# Patient Record
Sex: MALE | Race: WHITE | URBAN - NONMETROPOLITAN AREA
[De-identification: names, ages, dates, MRNs, and addresses within clinical notes are randomized per-mention and may not be internally consistent; named-entity substitution may affect disease eponyms.]

---

## 2017-02-16 ENCOUNTER — DOCTOR'S OFFICE (OUTPATIENT)
Dept: URBAN - NONMETROPOLITAN AREA CLINIC 1 | Facility: CLINIC | Age: 57
Setting detail: OPHTHALMOLOGY
End: 2017-02-16
Payer: COMMERCIAL

## 2017-02-16 DIAGNOSIS — H40.003: ICD-10-CM

## 2017-02-16 DIAGNOSIS — H35.3112: ICD-10-CM

## 2017-02-16 DIAGNOSIS — H34.11: ICD-10-CM

## 2017-02-16 DIAGNOSIS — H40.001: ICD-10-CM

## 2017-02-16 DIAGNOSIS — H40.002: ICD-10-CM

## 2017-02-16 PROCEDURE — 92014 COMPRE OPH EXAM EST PT 1/>: CPT | Performed by: OPHTHALMOLOGY

## 2017-02-16 PROCEDURE — 92235 FLUORESCEIN ANGRPH MLTIFRAME: CPT | Performed by: OPHTHALMOLOGY

## 2017-02-16 PROCEDURE — 92134 CPTRZ OPH DX IMG PST SGM RTA: CPT | Performed by: OPHTHALMOLOGY

## 2017-02-16 PROCEDURE — 92250 FUNDUS PHOTOGRAPHY W/I&R: CPT | Performed by: OPHTHALMOLOGY

## 2017-02-16 ASSESSMENT — REFRACTION_MANIFEST
OD_VA1: 20/
OU_VA: 20/
OS_VA2: 20/
OS_VA3: 20/
OS_VA1: 20/
OS_VA1: 20/
OD_VA2: 20/
OS_VA3: 20/
OD_VA2: 20/
OS_VA2: 20/
OD_VA3: 20/
OD_VA1: 20/
OU_VA: 20/
OD_VA1: 20/
OS_VA3: 20/
OS_VA2: 20/
OU_VA: 20/
OD_VA2: 20/
OD_VA3: 20/
OS_VA1: 20/
OD_VA3: 20/

## 2017-02-16 ASSESSMENT — VISUAL ACUITY
OS_BCVA: CF 3FT
OD_BCVA: 20/20

## 2017-02-16 ASSESSMENT — REFRACTION_CURRENTRX
OS_OVR_VA: 20/
OD_OVR_VA: 20/
OD_OVR_VA: 20/
OS_OVR_VA: 20/
OD_OVR_VA: 20/
OS_OVR_VA: 20/

## 2017-02-16 ASSESSMENT — CONFRONTATIONAL VISUAL FIELD TEST (CVF)
OD_FINDINGS: FULL
OS_FINDINGS: FULL

## 2017-04-20 ENCOUNTER — DOCTOR'S OFFICE (OUTPATIENT)
Dept: URBAN - NONMETROPOLITAN AREA CLINIC 1 | Facility: CLINIC | Age: 57
Setting detail: OPHTHALMOLOGY
End: 2017-04-20
Payer: COMMERCIAL

## 2017-04-20 DIAGNOSIS — H40.003: ICD-10-CM

## 2017-04-20 DIAGNOSIS — H40.002: ICD-10-CM

## 2017-04-20 DIAGNOSIS — H34.11: ICD-10-CM

## 2017-04-20 DIAGNOSIS — H40.001: ICD-10-CM

## 2017-04-20 DIAGNOSIS — H35.3112: ICD-10-CM

## 2017-04-20 PROCEDURE — 92134 CPTRZ OPH DX IMG PST SGM RTA: CPT | Performed by: OPHTHALMOLOGY

## 2017-04-20 PROCEDURE — 92020 GONIOSCOPY: CPT | Performed by: OPHTHALMOLOGY

## 2017-04-20 PROCEDURE — 92014 COMPRE OPH EXAM EST PT 1/>: CPT | Performed by: OPHTHALMOLOGY

## 2017-04-20 ASSESSMENT — REFRACTION_MANIFEST
OD_VA2: 20/
OS_VA2: 20/
OS_VA3: 20/
OS_VA1: 20/
OD_VA3: 20/
OD_VA1: 20/
OD_VA2: 20/
OS_VA1: 20/
OD_VA3: 20/
OS_VA1: 20/
OS_VA2: 20/
OU_VA: 20/
OU_VA: 20/
OD_VA1: 20/
OU_VA: 20/
OD_VA3: 20/
OS_VA3: 20/
OD_VA2: 20/
OD_VA1: 20/
OS_VA2: 20/
OS_VA3: 20/

## 2017-04-20 ASSESSMENT — REFRACTION_CURRENTRX
OD_OVR_VA: 20/
OS_OVR_VA: 20/
OS_OVR_VA: 20/
OD_OVR_VA: 20/
OS_OVR_VA: 20/
OD_OVR_VA: 20/

## 2017-04-20 ASSESSMENT — VISUAL ACUITY
OS_BCVA: CF 3FT
OD_BCVA: 20/20

## 2017-04-20 ASSESSMENT — CONFRONTATIONAL VISUAL FIELD TEST (CVF)
OD_FINDINGS: FULL
OS_FINDINGS: FULL

## 2017-06-22 ENCOUNTER — DOCTOR'S OFFICE (OUTPATIENT)
Dept: URBAN - NONMETROPOLITAN AREA CLINIC 1 | Facility: CLINIC | Age: 57
Setting detail: OPHTHALMOLOGY
End: 2017-06-22
Payer: COMMERCIAL

## 2017-06-22 DIAGNOSIS — H34.11: ICD-10-CM

## 2017-06-22 DIAGNOSIS — H35.3112: ICD-10-CM

## 2017-06-22 DIAGNOSIS — H40.003: ICD-10-CM

## 2017-06-22 PROCEDURE — 92250 FUNDUS PHOTOGRAPHY W/I&R: CPT | Performed by: OPHTHALMOLOGY

## 2017-06-22 PROCEDURE — 92014 COMPRE OPH EXAM EST PT 1/>: CPT | Performed by: OPHTHALMOLOGY

## 2017-06-22 PROCEDURE — 92134 CPTRZ OPH DX IMG PST SGM RTA: CPT | Performed by: OPHTHALMOLOGY

## 2017-06-22 PROCEDURE — 92235 FLUORESCEIN ANGRPH MLTIFRAME: CPT | Performed by: OPHTHALMOLOGY

## 2017-06-22 ASSESSMENT — CONFRONTATIONAL VISUAL FIELD TEST (CVF)
OD_FINDINGS: FULL
OS_FINDINGS: FULL

## 2017-06-22 ASSESSMENT — REFRACTION_MANIFEST
OS_VA1: 20/
OS_VA2: 20/
OU_VA: 20/
OS_VA1: 20/
OD_VA3: 20/
OS_VA2: 20/
OS_VA3: 20/
OD_VA2: 20/
OU_VA: 20/
OS_VA3: 20/
OU_VA: 20/
OS_VA2: 20/
OD_VA3: 20/
OD_VA1: 20/
OS_VA1: 20/
OD_VA1: 20/
OD_VA3: 20/
OD_VA1: 20/
OS_VA3: 20/

## 2017-06-22 ASSESSMENT — REFRACTION_CURRENTRX
OS_OVR_VA: 20/
OD_OVR_VA: 20/
OD_OVR_VA: 20/
OS_OVR_VA: 20/
OS_OVR_VA: 20/
OD_OVR_VA: 20/

## 2017-06-22 ASSESSMENT — VISUAL ACUITY
OS_BCVA: CF 3FT
OD_BCVA: 20/20-1

## 2017-10-20 ENCOUNTER — DOCTOR'S OFFICE (OUTPATIENT)
Dept: URBAN - NONMETROPOLITAN AREA CLINIC 1 | Facility: CLINIC | Age: 57
Setting detail: OPHTHALMOLOGY
End: 2017-10-20
Payer: COMMERCIAL

## 2017-10-20 DIAGNOSIS — H35.3112: ICD-10-CM

## 2017-10-20 DIAGNOSIS — H34.11: ICD-10-CM

## 2017-10-20 DIAGNOSIS — H40.003: ICD-10-CM

## 2017-10-20 PROCEDURE — 92134 CPTRZ OPH DX IMG PST SGM RTA: CPT | Performed by: OPHTHALMOLOGY

## 2017-10-20 PROCEDURE — 92014 COMPRE OPH EXAM EST PT 1/>: CPT | Performed by: OPHTHALMOLOGY

## 2017-10-20 ASSESSMENT — REFRACTION_CURRENTRX
OS_OVR_VA: 20/
OD_OVR_VA: 20/
OD_OVR_VA: 20/
OS_OVR_VA: 20/
OD_OVR_VA: 20/
OS_OVR_VA: 20/

## 2017-10-20 ASSESSMENT — REFRACTION_MANIFEST
OS_VA3: 20/
OD_VA3: 20/
OS_VA2: 20/
OD_VA2: 20/
OD_VA1: 20/
OD_VA1: 20/
OS_VA1: 20/
OD_VA3: 20/
OS_VA2: 20/
OU_VA: 20/
OD_VA1: 20/
OS_VA1: 20/
OU_VA: 20/
OS_VA2: 20/
OS_VA3: 20/
OD_VA2: 20/
OS_VA3: 20/
OD_VA2: 20/
OU_VA: 20/
OD_VA3: 20/
OS_VA1: 20/

## 2017-10-20 ASSESSMENT — CONFRONTATIONAL VISUAL FIELD TEST (CVF)
OD_FINDINGS: FULL
OS_FINDINGS: FULL

## 2017-10-20 ASSESSMENT — VISUAL ACUITY
OD_BCVA: 20/25-1
OS_BCVA: CF 3FT

## 2017-11-17 ENCOUNTER — DOCTOR'S OFFICE (OUTPATIENT)
Dept: URBAN - NONMETROPOLITAN AREA CLINIC 1 | Facility: CLINIC | Age: 57
Setting detail: OPHTHALMOLOGY
End: 2017-11-17
Payer: COMMERCIAL

## 2017-11-17 DIAGNOSIS — H40.003: ICD-10-CM

## 2017-11-17 DIAGNOSIS — H35.3112: ICD-10-CM

## 2017-11-17 DIAGNOSIS — H43.811: ICD-10-CM

## 2017-11-17 DIAGNOSIS — H34.11: ICD-10-CM

## 2017-11-17 DIAGNOSIS — H43.812: ICD-10-CM

## 2017-11-17 PROCEDURE — 92235 FLUORESCEIN ANGRPH MLTIFRAME: CPT | Performed by: OPHTHALMOLOGY

## 2017-11-17 PROCEDURE — 92250 FUNDUS PHOTOGRAPHY W/I&R: CPT | Performed by: OPHTHALMOLOGY

## 2017-11-17 PROCEDURE — 92014 COMPRE OPH EXAM EST PT 1/>: CPT | Performed by: OPHTHALMOLOGY

## 2017-11-17 PROCEDURE — 92226 OPHTHALMOSCOPY EXT SUBSEQUENT: CPT | Performed by: OPHTHALMOLOGY

## 2017-11-17 ASSESSMENT — REFRACTION_CURRENTRX
OD_OVR_VA: 20/
OD_OVR_VA: 20/
OS_OVR_VA: 20/
OD_OVR_VA: 20/
OS_OVR_VA: 20/
OS_OVR_VA: 20/

## 2017-11-17 ASSESSMENT — REFRACTION_MANIFEST
OS_VA1: 20/
OU_VA: 20/
OD_VA3: 20/
OS_VA3: 20/
OS_VA3: 20/
OD_VA2: 20/
OD_VA3: 20/
OD_VA3: 20/
OS_VA3: 20/
OD_VA2: 20/
OD_VA1: 20/
OD_VA1: 20/
OS_VA1: 20/
OU_VA: 20/
OS_VA2: 20/
OU_VA: 20/
OS_VA2: 20/
OS_VA2: 20/
OS_VA1: 20/
OD_VA1: 20/
OD_VA2: 20/

## 2017-11-17 ASSESSMENT — CONFRONTATIONAL VISUAL FIELD TEST (CVF)
OD_FINDINGS: FULL
OS_FINDINGS: FULL

## 2017-11-17 ASSESSMENT — VISUAL ACUITY
OS_BCVA: CF 3FT
OD_BCVA: 20/25-1

## 2018-01-11 ENCOUNTER — DOCTOR'S OFFICE (OUTPATIENT)
Dept: URBAN - NONMETROPOLITAN AREA CLINIC 1 | Facility: CLINIC | Age: 58
Setting detail: OPHTHALMOLOGY
End: 2018-01-11
Payer: COMMERCIAL

## 2018-01-11 DIAGNOSIS — G45.9: ICD-10-CM

## 2018-01-11 DIAGNOSIS — H34.11: ICD-10-CM

## 2018-01-11 DIAGNOSIS — H43.812: ICD-10-CM

## 2018-01-11 DIAGNOSIS — H40.003: ICD-10-CM

## 2018-01-11 DIAGNOSIS — H43.813: ICD-10-CM

## 2018-01-11 DIAGNOSIS — H35.3112: ICD-10-CM

## 2018-01-11 DIAGNOSIS — H43.811: ICD-10-CM

## 2018-01-11 PROCEDURE — 92226 OPHTHALMOSCOPY EXT SUBSEQUENT: CPT | Performed by: OPHTHALMOLOGY

## 2018-01-11 PROCEDURE — 92134 CPTRZ OPH DX IMG PST SGM RTA: CPT | Performed by: OPHTHALMOLOGY

## 2018-01-11 PROCEDURE — 92014 COMPRE OPH EXAM EST PT 1/>: CPT | Performed by: OPHTHALMOLOGY

## 2018-01-11 ASSESSMENT — REFRACTION_CURRENTRX
OS_OVR_VA: 20/
OS_OVR_VA: 20/
OD_OVR_VA: 20/
OS_OVR_VA: 20/

## 2018-01-11 ASSESSMENT — REFRACTION_MANIFEST
OS_VA3: 20/
OD_VA3: 20/
OU_VA: 20/
OD_VA1: 20/
OD_VA2: 20/
OS_VA1: 20/
OS_VA2: 20/
OS_VA2: 20/
OS_VA1: 20/
OU_VA: 20/
OS_VA1: 20/
OD_VA2: 20/
OD_VA2: 20/
OS_VA3: 20/
OS_VA3: 20/
OD_VA1: 20/
OU_VA: 20/
OD_VA1: 20/
OD_VA3: 20/
OS_VA2: 20/
OD_VA3: 20/

## 2018-01-11 ASSESSMENT — CONFRONTATIONAL VISUAL FIELD TEST (CVF)
OS_FINDINGS: FULL
OD_FINDINGS: FULL

## 2018-01-11 ASSESSMENT — VISUAL ACUITY
OD_BCVA: 20/25
OS_BCVA: 20/300

## 2018-01-12 ENCOUNTER — DOCTOR'S OFFICE (OUTPATIENT)
Dept: URBAN - NONMETROPOLITAN AREA CLINIC 1 | Facility: CLINIC | Age: 58
Setting detail: OPHTHALMOLOGY
End: 2018-01-12
Payer: COMMERCIAL

## 2018-01-12 DIAGNOSIS — G45.9: ICD-10-CM

## 2018-01-12 PROCEDURE — 92083 EXTENDED VISUAL FIELD XM: CPT | Performed by: OPHTHALMOLOGY

## 2018-01-25 ENCOUNTER — DOCTOR'S OFFICE (OUTPATIENT)
Dept: URBAN - NONMETROPOLITAN AREA CLINIC 1 | Facility: CLINIC | Age: 58
Setting detail: OPHTHALMOLOGY
End: 2018-01-25
Payer: COMMERCIAL

## 2018-01-25 DIAGNOSIS — H35.3112: ICD-10-CM

## 2018-01-25 DIAGNOSIS — G45.9: ICD-10-CM

## 2018-01-25 DIAGNOSIS — H43.813: ICD-10-CM

## 2018-01-25 DIAGNOSIS — H40.003: ICD-10-CM

## 2018-01-25 DIAGNOSIS — H34.11: ICD-10-CM

## 2018-01-25 PROCEDURE — 92014 COMPRE OPH EXAM EST PT 1/>: CPT | Performed by: OPHTHALMOLOGY

## 2018-01-25 PROCEDURE — 92250 FUNDUS PHOTOGRAPHY W/I&R: CPT | Performed by: OPHTHALMOLOGY

## 2018-01-25 PROCEDURE — 92235 FLUORESCEIN ANGRPH MLTIFRAME: CPT | Performed by: OPHTHALMOLOGY

## 2018-01-25 ASSESSMENT — VISUAL ACUITY
OS_BCVA: 20/400
OD_BCVA: 20/20

## 2018-01-25 ASSESSMENT — REFRACTION_MANIFEST
OS_VA1: 20/
OD_VA2: 20/
OD_VA2: 20/
OD_VA3: 20/
OS_VA1: 20/
OD_VA1: 20/
OS_VA2: 20/
OD_VA1: 20/
OS_VA3: 20/
OU_VA: 20/
OS_VA1: 20/
OD_VA2: 20/
OU_VA: 20/
OS_VA3: 20/
OD_VA3: 20/
OS_VA3: 20/
OD_VA1: 20/
OS_VA2: 20/
OU_VA: 20/
OD_VA3: 20/
OS_VA2: 20/

## 2018-01-25 ASSESSMENT — REFRACTION_CURRENTRX
OD_OVR_VA: 20/
OS_OVR_VA: 20/
OD_OVR_VA: 20/
OS_OVR_VA: 20/
OS_OVR_VA: 20/
OD_OVR_VA: 20/

## 2018-01-25 ASSESSMENT — CONFRONTATIONAL VISUAL FIELD TEST (CVF)
OS_FINDINGS: FULL
OD_FINDINGS: FULL

## 2018-03-22 ENCOUNTER — DOCTOR'S OFFICE (OUTPATIENT)
Dept: URBAN - NONMETROPOLITAN AREA CLINIC 1 | Facility: CLINIC | Age: 58
Setting detail: OPHTHALMOLOGY
End: 2018-03-22
Payer: COMMERCIAL

## 2018-03-22 DIAGNOSIS — H35.3112: ICD-10-CM

## 2018-03-22 DIAGNOSIS — H43.811: ICD-10-CM

## 2018-03-22 DIAGNOSIS — H43.812: ICD-10-CM

## 2018-03-22 DIAGNOSIS — H40.003: ICD-10-CM

## 2018-03-22 DIAGNOSIS — H43.813: ICD-10-CM

## 2018-03-22 DIAGNOSIS — H34.11: ICD-10-CM

## 2018-03-22 PROCEDURE — 92134 CPTRZ OPH DX IMG PST SGM RTA: CPT | Performed by: OPHTHALMOLOGY

## 2018-03-22 PROCEDURE — 92226 OPHTHALMOSCOPY EXT SUBSEQUENT: CPT | Performed by: OPHTHALMOLOGY

## 2018-03-22 PROCEDURE — 92014 COMPRE OPH EXAM EST PT 1/>: CPT | Performed by: OPHTHALMOLOGY

## 2018-03-22 ASSESSMENT — REFRACTION_MANIFEST
OD_VA1: 20/
OD_VA3: 20/
OS_VA1: 20/
OS_VA1: 20/
OD_VA3: 20/
OU_VA: 20/
OD_VA2: 20/
OS_VA1: 20/
OS_VA2: 20/
OU_VA: 20/
OS_VA2: 20/
OU_VA: 20/
OS_VA2: 20/
OS_VA3: 20/
OD_VA2: 20/
OS_VA3: 20/
OD_VA2: 20/
OD_VA1: 20/
OD_VA3: 20/
OD_VA1: 20/
OS_VA3: 20/

## 2018-03-22 ASSESSMENT — REFRACTION_CURRENTRX
OS_OVR_VA: 20/
OD_OVR_VA: 20/

## 2018-03-22 ASSESSMENT — VISUAL ACUITY
OD_BCVA: 20/20
OS_BCVA: 20/400

## 2018-03-22 ASSESSMENT — CONFRONTATIONAL VISUAL FIELD TEST (CVF)
OS_FINDINGS: FULL
OD_FINDINGS: FULL

## 2018-05-31 ENCOUNTER — DOCTOR'S OFFICE (OUTPATIENT)
Dept: URBAN - NONMETROPOLITAN AREA CLINIC 1 | Facility: CLINIC | Age: 58
Setting detail: OPHTHALMOLOGY
End: 2018-05-31
Payer: COMMERCIAL

## 2018-05-31 DIAGNOSIS — G45.9: ICD-10-CM

## 2018-05-31 DIAGNOSIS — H43.812: ICD-10-CM

## 2018-05-31 DIAGNOSIS — H40.003: ICD-10-CM

## 2018-05-31 DIAGNOSIS — H34.11: ICD-10-CM

## 2018-05-31 DIAGNOSIS — H43.813: ICD-10-CM

## 2018-05-31 DIAGNOSIS — H35.3112: ICD-10-CM

## 2018-05-31 DIAGNOSIS — H43.811: ICD-10-CM

## 2018-05-31 PROCEDURE — 92014 COMPRE OPH EXAM EST PT 1/>: CPT | Performed by: OPHTHALMOLOGY

## 2018-05-31 PROCEDURE — 92226 OPHTHALMOSCOPY EXT SUBSEQUENT: CPT | Performed by: OPHTHALMOLOGY

## 2018-05-31 PROCEDURE — 92134 CPTRZ OPH DX IMG PST SGM RTA: CPT | Performed by: OPHTHALMOLOGY

## 2018-05-31 ASSESSMENT — VISUAL ACUITY
OD_BCVA: 20/20
OS_BCVA: 20/400

## 2018-05-31 ASSESSMENT — REFRACTION_MANIFEST
OS_VA2: 20/
OS_VA3: 20/
OS_VA1: 20/
OD_VA3: 20/
OD_VA2: 20/
OD_VA1: 20/
OS_VA1: 20/
OS_VA3: 20/
OS_VA2: 20/
OU_VA: 20/
OS_VA1: 20/
OD_VA3: 20/
OD_VA3: 20/
OD_VA2: 20/
OD_VA1: 20/
OS_VA3: 20/
OU_VA: 20/
OU_VA: 20/
OS_VA2: 20/
OD_VA2: 20/
OD_VA1: 20/

## 2018-05-31 ASSESSMENT — REFRACTION_CURRENTRX
OS_OVR_VA: 20/
OS_OVR_VA: 20/
OD_OVR_VA: 20/
OS_OVR_VA: 20/
OD_OVR_VA: 20/
OD_OVR_VA: 20/

## 2018-05-31 ASSESSMENT — CONFRONTATIONAL VISUAL FIELD TEST (CVF)
OD_FINDINGS: FULL
OS_FINDINGS: FULL

## 2018-06-15 ENCOUNTER — DOCTOR'S OFFICE (OUTPATIENT)
Dept: URBAN - NONMETROPOLITAN AREA CLINIC 1 | Facility: CLINIC | Age: 58
Setting detail: OPHTHALMOLOGY
End: 2018-06-15
Payer: COMMERCIAL

## 2018-06-15 DIAGNOSIS — H35.3112: ICD-10-CM

## 2018-06-15 DIAGNOSIS — H34.11: ICD-10-CM

## 2018-06-15 DIAGNOSIS — H43.813: ICD-10-CM

## 2018-06-15 DIAGNOSIS — H40.003: ICD-10-CM

## 2018-06-15 PROCEDURE — 92235 FLUORESCEIN ANGRPH MLTIFRAME: CPT | Performed by: OPHTHALMOLOGY

## 2018-06-15 PROCEDURE — 92250 FUNDUS PHOTOGRAPHY W/I&R: CPT | Performed by: OPHTHALMOLOGY

## 2018-06-15 PROCEDURE — 92014 COMPRE OPH EXAM EST PT 1/>: CPT | Performed by: OPHTHALMOLOGY

## 2018-06-15 ASSESSMENT — REFRACTION_MANIFEST
OS_VA3: 20/
OS_VA1: 20/
OS_VA3: 20/
OU_VA: 20/
OD_VA1: 20/
OD_VA2: 20/
OD_VA3: 20/
OS_VA1: 20/
OU_VA: 20/
OD_VA3: 20/
OS_VA3: 20/
OS_VA2: 20/
OD_VA1: 20/
OU_VA: 20/
OD_VA3: 20/
OD_VA2: 20/
OD_VA1: 20/
OD_VA2: 20/
OS_VA2: 20/
OS_VA2: 20/
OS_VA1: 20/

## 2018-06-15 ASSESSMENT — REFRACTION_CURRENTRX
OD_OVR_VA: 20/
OS_OVR_VA: 20/
OD_OVR_VA: 20/
OS_OVR_VA: 20/
OS_OVR_VA: 20/
OD_OVR_VA: 20/

## 2018-06-15 ASSESSMENT — VISUAL ACUITY
OS_BCVA: 20/400
OD_BCVA: 20/20

## 2018-06-15 ASSESSMENT — CONFRONTATIONAL VISUAL FIELD TEST (CVF)
OD_FINDINGS: FULL
OS_FINDINGS: FULL

## 2018-08-16 ENCOUNTER — DOCTOR'S OFFICE (OUTPATIENT)
Dept: URBAN - NONMETROPOLITAN AREA CLINIC 1 | Facility: CLINIC | Age: 58
Setting detail: OPHTHALMOLOGY
End: 2018-08-16
Payer: COMMERCIAL

## 2018-08-16 DIAGNOSIS — H43.812: ICD-10-CM

## 2018-08-16 DIAGNOSIS — H43.813: ICD-10-CM

## 2018-08-16 DIAGNOSIS — H43.811: ICD-10-CM

## 2018-08-16 DIAGNOSIS — H35.3112: ICD-10-CM

## 2018-08-16 DIAGNOSIS — H34.11: ICD-10-CM

## 2018-08-16 DIAGNOSIS — H40.003: ICD-10-CM

## 2018-08-16 PROCEDURE — 92014 COMPRE OPH EXAM EST PT 1/>: CPT | Performed by: OPHTHALMOLOGY

## 2018-08-16 PROCEDURE — 92134 CPTRZ OPH DX IMG PST SGM RTA: CPT | Performed by: OPHTHALMOLOGY

## 2018-08-16 PROCEDURE — 92226 OPHTHALMOSCOPY EXT SUBSEQUENT: CPT | Performed by: OPHTHALMOLOGY

## 2018-08-16 ASSESSMENT — REFRACTION_MANIFEST
OS_VA2: 20/
OU_VA: 20/
OS_VA1: 20/
OS_VA3: 20/
OD_VA1: 20/
OD_VA2: 20/
OD_VA2: 20/
OD_VA1: 20/
OU_VA: 20/
OS_VA3: 20/
OS_VA2: 20/
OU_VA: 20/
OS_VA1: 20/
OD_VA3: 20/
OS_VA1: 20/
OD_VA1: 20/
OD_VA3: 20/
OD_VA3: 20/
OS_VA2: 20/
OS_VA3: 20/
OD_VA2: 20/

## 2018-08-16 ASSESSMENT — CONFRONTATIONAL VISUAL FIELD TEST (CVF)
OS_FINDINGS: FULL
OD_FINDINGS: FULL

## 2018-08-16 ASSESSMENT — REFRACTION_CURRENTRX
OD_OVR_VA: 20/
OS_OVR_VA: 20/
OD_OVR_VA: 20/
OD_OVR_VA: 20/
OS_OVR_VA: 20/
OS_OVR_VA: 20/

## 2018-08-16 ASSESSMENT — VISUAL ACUITY
OD_BCVA: 20/20
OS_BCVA: 20/400

## 2018-10-01 ENCOUNTER — DOCTOR'S OFFICE (OUTPATIENT)
Dept: URBAN - NONMETROPOLITAN AREA CLINIC 1 | Facility: CLINIC | Age: 58
Setting detail: OPHTHALMOLOGY
End: 2018-10-01
Payer: COMMERCIAL

## 2018-10-01 DIAGNOSIS — H34.11: ICD-10-CM

## 2018-10-01 DIAGNOSIS — G45.9: ICD-10-CM

## 2018-10-01 DIAGNOSIS — H43.811: ICD-10-CM

## 2018-10-01 DIAGNOSIS — H35.3112: ICD-10-CM

## 2018-10-01 DIAGNOSIS — H40.013: ICD-10-CM

## 2018-10-01 DIAGNOSIS — H43.813: ICD-10-CM

## 2018-10-01 DIAGNOSIS — H43.812: ICD-10-CM

## 2018-10-01 PROCEDURE — 92226 OPHTHALMOSCOPY EXT SUBSEQUENT: CPT | Performed by: OPHTHALMOLOGY

## 2018-10-01 PROCEDURE — 92134 CPTRZ OPH DX IMG PST SGM RTA: CPT | Performed by: OPHTHALMOLOGY

## 2018-10-01 PROCEDURE — 92014 COMPRE OPH EXAM EST PT 1/>: CPT | Performed by: OPHTHALMOLOGY

## 2018-10-01 ASSESSMENT — REFRACTION_MANIFEST
OD_VA2: 20/
OD_VA1: 20/
OD_VA3: 20/
OS_VA3: 20/
OD_VA1: 20/
OD_VA3: 20/
OS_VA3: 20/
OS_VA1: 20/
OS_VA2: 20/
OS_VA1: 20/
OS_VA2: 20/
OD_VA2: 20/
OU_VA: 20/
OU_VA: 20/

## 2018-10-01 ASSESSMENT — REFRACTION_CURRENTRX
OD_OVR_VA: 20/
OD_OVR_VA: 20/
OS_OVR_VA: 20/
OD_OVR_VA: 20/
OS_OVR_VA: 20/
OS_OVR_VA: 20/

## 2018-10-01 ASSESSMENT — CONFRONTATIONAL VISUAL FIELD TEST (CVF)
OD_FINDINGS: FULL
OS_FINDINGS: FULL

## 2018-10-01 ASSESSMENT — VISUAL ACUITY
OD_BCVA: 20/20
OS_BCVA: 20/400

## 2018-11-30 ENCOUNTER — DOCTOR'S OFFICE (OUTPATIENT)
Dept: URBAN - NONMETROPOLITAN AREA CLINIC 1 | Facility: CLINIC | Age: 58
Setting detail: OPHTHALMOLOGY
End: 2018-11-30
Payer: COMMERCIAL

## 2018-11-30 DIAGNOSIS — H40.013: ICD-10-CM

## 2018-11-30 PROCEDURE — 92083 EXTENDED VISUAL FIELD XM: CPT | Performed by: OPHTHALMOLOGY

## 2018-11-30 PROCEDURE — 76514 ECHO EXAM OF EYE THICKNESS: CPT | Performed by: OPHTHALMOLOGY

## 2018-12-10 ENCOUNTER — DOCTOR'S OFFICE (OUTPATIENT)
Dept: URBAN - NONMETROPOLITAN AREA CLINIC 1 | Facility: CLINIC | Age: 58
Setting detail: OPHTHALMOLOGY
End: 2018-12-10
Payer: COMMERCIAL

## 2018-12-10 DIAGNOSIS — H43.811: ICD-10-CM

## 2018-12-10 DIAGNOSIS — H35.3112: ICD-10-CM

## 2018-12-10 DIAGNOSIS — H43.812: ICD-10-CM

## 2018-12-10 DIAGNOSIS — H43.813: ICD-10-CM

## 2018-12-10 DIAGNOSIS — H40.003: ICD-10-CM

## 2018-12-10 PROCEDURE — 92134 CPTRZ OPH DX IMG PST SGM RTA: CPT | Performed by: OPHTHALMOLOGY

## 2018-12-10 PROCEDURE — 92226 OPHTHALMOSCOPY EXT SUBSEQUENT: CPT | Performed by: OPHTHALMOLOGY

## 2018-12-10 PROCEDURE — 92014 COMPRE OPH EXAM EST PT 1/>: CPT | Performed by: OPHTHALMOLOGY

## 2018-12-10 ASSESSMENT — REFRACTION_MANIFEST
OD_VA2: 20/
OD_VA1: 20/
OS_VA3: 20/
OD_VA3: 20/
OS_VA2: 20/
OS_VA2: 20/
OS_VA1: 20/
OU_VA: 20/
OD_VA2: 20/
OU_VA: 20/
OS_VA1: 20/
OS_VA3: 20/
OD_VA1: 20/
OD_VA3: 20/

## 2018-12-10 ASSESSMENT — CONFRONTATIONAL VISUAL FIELD TEST (CVF)
OD_FINDINGS: FULL
OS_FINDINGS: FULL

## 2018-12-10 ASSESSMENT — REFRACTION_CURRENTRX
OD_OVR_VA: 20/
OD_OVR_VA: 20/
OS_OVR_VA: 20/
OS_OVR_VA: 20/
OD_OVR_VA: 20/
OS_OVR_VA: 20/

## 2018-12-10 ASSESSMENT — VISUAL ACUITY
OD_BCVA: 20/20-2
OS_BCVA: 20/400

## 2019-01-14 ENCOUNTER — DOCTOR'S OFFICE (OUTPATIENT)
Dept: URBAN - NONMETROPOLITAN AREA CLINIC 1 | Facility: CLINIC | Age: 59
Setting detail: OPHTHALMOLOGY
End: 2019-01-14
Payer: COMMERCIAL

## 2019-01-14 DIAGNOSIS — H40.003: ICD-10-CM

## 2019-01-14 DIAGNOSIS — H43.811: ICD-10-CM

## 2019-01-14 DIAGNOSIS — H35.3112: ICD-10-CM

## 2019-01-14 DIAGNOSIS — H43.813: ICD-10-CM

## 2019-01-14 DIAGNOSIS — H43.812: ICD-10-CM

## 2019-01-14 DIAGNOSIS — H34.11: ICD-10-CM

## 2019-01-14 PROCEDURE — 92134 CPTRZ OPH DX IMG PST SGM RTA: CPT | Performed by: OPHTHALMOLOGY

## 2019-01-14 PROCEDURE — 92014 COMPRE OPH EXAM EST PT 1/>: CPT | Performed by: OPHTHALMOLOGY

## 2019-01-14 PROCEDURE — 92250 FUNDUS PHOTOGRAPHY W/I&R: CPT | Performed by: OPHTHALMOLOGY

## 2019-01-14 PROCEDURE — 92226 OPHTHALMOSCOPY EXT SUBSEQUENT: CPT | Performed by: OPHTHALMOLOGY

## 2019-01-14 PROCEDURE — 92235 FLUORESCEIN ANGRPH MLTIFRAME: CPT | Performed by: OPHTHALMOLOGY

## 2019-01-14 ASSESSMENT — VISUAL ACUITY
OS_BCVA: 20/400
OD_BCVA: 20/20-1

## 2019-01-14 ASSESSMENT — REFRACTION_CURRENTRX
OS_OVR_VA: 20/
OD_OVR_VA: 20/
OS_OVR_VA: 20/
OD_OVR_VA: 20/
OS_OVR_VA: 20/
OD_OVR_VA: 20/

## 2019-01-14 ASSESSMENT — CONFRONTATIONAL VISUAL FIELD TEST (CVF)
OD_FINDINGS: FULL
OS_FINDINGS: FULL

## 2019-01-14 ASSESSMENT — REFRACTION_MANIFEST
OD_VA1: 20/
OS_VA2: 20/
OU_VA: 20/
OS_VA1: 20/
OD_VA1: 20/
OD_VA2: 20/
OD_VA3: 20/
OD_VA2: 20/
OS_VA3: 20/
OS_VA1: 20/
OS_VA3: 20/
OD_VA3: 20/
OS_VA2: 20/
OU_VA: 20/

## 2019-03-11 ENCOUNTER — DOCTOR'S OFFICE (OUTPATIENT)
Dept: URBAN - NONMETROPOLITAN AREA CLINIC 1 | Facility: CLINIC | Age: 59
Setting detail: OPHTHALMOLOGY
End: 2019-03-11
Payer: COMMERCIAL

## 2019-03-11 DIAGNOSIS — H35.3112: ICD-10-CM

## 2019-03-11 DIAGNOSIS — H43.812: ICD-10-CM

## 2019-03-11 DIAGNOSIS — H40.003: ICD-10-CM

## 2019-03-11 DIAGNOSIS — H43.811: ICD-10-CM

## 2019-03-11 PROCEDURE — 92014 COMPRE OPH EXAM EST PT 1/>: CPT | Performed by: OPHTHALMOLOGY

## 2019-03-11 PROCEDURE — 92226 OPHTHALMOSCOPY EXT SUBSEQUENT: CPT | Performed by: OPHTHALMOLOGY

## 2019-03-11 PROCEDURE — 92134 CPTRZ OPH DX IMG PST SGM RTA: CPT | Performed by: OPHTHALMOLOGY

## 2019-03-11 ASSESSMENT — REFRACTION_MANIFEST
OS_VA3: 20/
OD_VA1: 20/
OD_VA1: 20/
OD_VA3: 20/
OD_VA3: 20/
OS_VA3: 20/
OD_VA2: 20/
OS_VA1: 20/
OU_VA: 20/
OS_VA2: 20/
OD_VA2: 20/
OS_VA2: 20/
OS_VA1: 20/
OU_VA: 20/

## 2019-03-11 ASSESSMENT — REFRACTION_CURRENTRX
OS_OVR_VA: 20/
OD_OVR_VA: 20/

## 2019-03-11 ASSESSMENT — CONFRONTATIONAL VISUAL FIELD TEST (CVF)
OD_FINDINGS: FULL
OS_FINDINGS: FULL

## 2019-03-11 ASSESSMENT — VISUAL ACUITY
OS_BCVA: 20/400
OD_BCVA: 20/20

## 2019-05-31 ENCOUNTER — DOCTOR'S OFFICE (OUTPATIENT)
Dept: URBAN - NONMETROPOLITAN AREA CLINIC 1 | Facility: CLINIC | Age: 59
Setting detail: OPHTHALMOLOGY
End: 2019-05-31
Payer: COMMERCIAL

## 2019-05-31 DIAGNOSIS — H34.11: ICD-10-CM

## 2019-05-31 DIAGNOSIS — H40.003: ICD-10-CM

## 2019-05-31 DIAGNOSIS — H35.3112: ICD-10-CM

## 2019-05-31 DIAGNOSIS — H04.122: ICD-10-CM

## 2019-05-31 DIAGNOSIS — H43.813: ICD-10-CM

## 2019-05-31 DIAGNOSIS — H04.123: ICD-10-CM

## 2019-05-31 PROCEDURE — 92014 COMPRE OPH EXAM EST PT 1/>: CPT | Performed by: OPHTHALMOLOGY

## 2019-05-31 PROCEDURE — 83861 MICROFLUID ANALY TEARS: CPT | Performed by: OPHTHALMOLOGY

## 2019-05-31 PROCEDURE — 92134 CPTRZ OPH DX IMG PST SGM RTA: CPT | Performed by: OPHTHALMOLOGY

## 2019-05-31 ASSESSMENT — REFRACTION_CURRENTRX
OD_OVR_VA: 20/
OD_OVR_VA: 20/
OS_OVR_VA: 20/
OD_OVR_VA: 20/
OS_OVR_VA: 20/
OS_OVR_VA: 20/

## 2019-05-31 ASSESSMENT — REFRACTION_MANIFEST
OU_VA: 20/
OS_VA1: 20/
OU_VA: 20/
OD_VA3: 20/
OS_VA2: 20/
OS_VA2: 20/
OD_VA2: 20/
OD_VA3: 20/
OD_VA1: 20/
OS_VA3: 20/
OS_VA3: 20/
OD_VA2: 20/
OS_VA1: 20/
OD_VA1: 20/

## 2019-05-31 ASSESSMENT — VISUAL ACUITY
OD_BCVA: 20/20
OS_BCVA: 20/400

## 2019-05-31 ASSESSMENT — CONFRONTATIONAL VISUAL FIELD TEST (CVF)
OS_FINDINGS: FULL
OD_FINDINGS: FULL

## 2019-08-05 ENCOUNTER — DOCTOR'S OFFICE (OUTPATIENT)
Dept: URBAN - NONMETROPOLITAN AREA CLINIC 1 | Facility: CLINIC | Age: 59
Setting detail: OPHTHALMOLOGY
End: 2019-08-05
Payer: COMMERCIAL

## 2019-08-05 DIAGNOSIS — H43.811: ICD-10-CM

## 2019-08-05 DIAGNOSIS — H35.3112: ICD-10-CM

## 2019-08-05 DIAGNOSIS — H34.11: ICD-10-CM

## 2019-08-05 DIAGNOSIS — H04.122: ICD-10-CM

## 2019-08-05 DIAGNOSIS — H43.813: ICD-10-CM

## 2019-08-05 DIAGNOSIS — H04.123: ICD-10-CM

## 2019-08-05 DIAGNOSIS — H43.812: ICD-10-CM

## 2019-08-05 DIAGNOSIS — H04.121: ICD-10-CM

## 2019-08-05 PROCEDURE — 83861 MICROFLUID ANALY TEARS: CPT | Performed by: OPHTHALMOLOGY

## 2019-08-05 PROCEDURE — 92226 OPHTHALMOSCOPY EXT SUBSEQUENT: CPT | Performed by: OPHTHALMOLOGY

## 2019-08-05 PROCEDURE — 92014 COMPRE OPH EXAM EST PT 1/>: CPT | Performed by: OPHTHALMOLOGY

## 2019-08-05 PROCEDURE — 92134 CPTRZ OPH DX IMG PST SGM RTA: CPT | Performed by: OPHTHALMOLOGY

## 2019-08-05 ASSESSMENT — CONFRONTATIONAL VISUAL FIELD TEST (CVF)
OS_FINDINGS: FULL
OD_FINDINGS: FULL

## 2019-08-05 ASSESSMENT — REFRACTION_CURRENTRX
OD_OVR_VA: 20/
OS_OVR_VA: 20/
OS_OVR_VA: 20/
OD_OVR_VA: 20/
OD_OVR_VA: 20/
OS_OVR_VA: 20/

## 2019-08-05 ASSESSMENT — REFRACTION_MANIFEST
OD_VA3: 20/
OS_VA1: 20/
OS_VA2: 20/
OD_VA2: 20/
OS_VA3: 20/
OD_VA1: 20/
OS_VA1: 20/
OD_VA1: 20/
OD_VA3: 20/
OU_VA: 20/
OU_VA: 20/
OD_VA2: 20/
OS_VA2: 20/
OS_VA3: 20/

## 2019-08-05 ASSESSMENT — VISUAL ACUITY
OD_BCVA: 20/20
OS_BCVA: 20/400

## 2019-09-16 ENCOUNTER — DOCTOR'S OFFICE (OUTPATIENT)
Dept: URBAN - NONMETROPOLITAN AREA CLINIC 1 | Facility: CLINIC | Age: 59
Setting detail: OPHTHALMOLOGY
End: 2019-09-16
Payer: COMMERCIAL

## 2019-09-16 ENCOUNTER — RX ONLY (RX ONLY)
Age: 59
End: 2019-09-16

## 2019-09-16 DIAGNOSIS — H35.3112: ICD-10-CM

## 2019-09-16 DIAGNOSIS — H43.813: ICD-10-CM

## 2019-09-16 DIAGNOSIS — H43.812: ICD-10-CM

## 2019-09-16 DIAGNOSIS — H04.123: ICD-10-CM

## 2019-09-16 DIAGNOSIS — H34.11: ICD-10-CM

## 2019-09-16 DIAGNOSIS — H04.122: ICD-10-CM

## 2019-09-16 DIAGNOSIS — H43.811: ICD-10-CM

## 2019-09-16 PROCEDURE — 92235 FLUORESCEIN ANGRPH MLTIFRAME: CPT | Performed by: OPHTHALMOLOGY

## 2019-09-16 PROCEDURE — 92226 OPHTHALMOSCOPY EXT SUBSEQUENT: CPT | Performed by: OPHTHALMOLOGY

## 2019-09-16 PROCEDURE — 83861 MICROFLUID ANALY TEARS: CPT | Performed by: OPHTHALMOLOGY

## 2019-09-16 PROCEDURE — 92014 COMPRE OPH EXAM EST PT 1/>: CPT | Performed by: OPHTHALMOLOGY

## 2019-09-16 PROCEDURE — 92250 FUNDUS PHOTOGRAPHY W/I&R: CPT | Performed by: OPHTHALMOLOGY

## 2019-09-16 ASSESSMENT — REFRACTION_MANIFEST
OD_VA2: 20/
OS_VA1: 20/
OS_VA3: 20/
OD_VA1: 20/
OU_VA: 20/
OS_VA3: 20/
OS_VA1: 20/
OU_VA: 20/
OS_VA2: 20/
OD_VA2: 20/
OD_VA1: 20/
OD_VA3: 20/
OD_VA3: 20/
OS_VA2: 20/

## 2019-09-16 ASSESSMENT — VISUAL ACUITY
OS_BCVA: 20/400
OD_BCVA: 20/20

## 2019-09-16 ASSESSMENT — CONFRONTATIONAL VISUAL FIELD TEST (CVF)
OD_FINDINGS: FULL
OS_FINDINGS: FULL

## 2019-09-16 ASSESSMENT — REFRACTION_CURRENTRX
OS_OVR_VA: 20/
OD_OVR_VA: 20/
OD_OVR_VA: 20/
OS_OVR_VA: 20/
OS_OVR_VA: 20/
OD_OVR_VA: 20/

## 2021-05-03 ENCOUNTER — DOCTOR'S OFFICE (OUTPATIENT)
Dept: URBAN - NONMETROPOLITAN AREA CLINIC 1 | Facility: CLINIC | Age: 61
Setting detail: OPHTHALMOLOGY
End: 2021-05-03
Payer: COMMERCIAL

## 2021-05-03 VITALS — HEIGHT: 60 IN

## 2021-05-03 DIAGNOSIS — H52.4: ICD-10-CM

## 2021-05-03 DIAGNOSIS — H52.03: ICD-10-CM

## 2021-05-03 PROBLEM — H04.121 DRY EYE; RIGHT EYE, LEFT EYE: Status: ACTIVE | Noted: 2019-05-31

## 2021-05-03 PROBLEM — H35.3112: Status: RESOLVED | Noted: 2017-02-16 | Resolved: 2021-05-03

## 2021-05-03 PROBLEM — H43.811 POST VITREOUS DETACHMENT; RIGHT EYE, LEFT EYE: Status: ACTIVE | Noted: 2017-11-17

## 2021-05-03 PROBLEM — G45.9 TIA: Status: ACTIVE | Noted: 2018-01-11

## 2021-05-03 PROBLEM — H43.812 POST VITREOUS DETACHMENT; RIGHT EYE, LEFT EYE: Status: ACTIVE | Noted: 2017-11-17

## 2021-05-03 PROBLEM — H04.122 DRY EYE; RIGHT EYE, LEFT EYE: Status: ACTIVE | Noted: 2019-05-31

## 2021-05-03 PROBLEM — H40.002 GLAUCOMA SUSPECT; RIGHT EYE, LEFT EYE, BOTH EYES: Status: ACTIVE | Noted: 2017-02-16

## 2021-05-03 PROBLEM — H40.003 GLAUCOMA SUSPECT; RIGHT EYE, LEFT EYE, BOTH EYES: Status: ACTIVE | Noted: 2017-02-16

## 2021-05-03 PROBLEM — H40.001 GLAUCOMA SUSPECT; RIGHT EYE, LEFT EYE, BOTH EYES: Status: ACTIVE | Noted: 2017-02-16

## 2021-05-03 PROCEDURE — 92014 COMPRE OPH EXAM EST PT 1/>: CPT | Performed by: OPTOMETRIST

## 2021-05-03 ASSESSMENT — REFRACTION_AUTOREFRACTION
OD_CYLINDER: -0.25
OS_SPHERE: +0.75
OD_AXIS: 118
OS_CYLINDER: -0.25
OD_SPHERE: +0.75
OS_AXIS: 001

## 2021-05-03 ASSESSMENT — REFRACTION_MANIFEST
OD_VA2: 20/400
OD_SPHERE: +0.75
OD_VA1: 20/400
OD_AXIS: 120
OD_ADD: +2.50
OS_VA1: 20/20
OS_SPHERE: +0.75
OS_AXIS: 180
OS_ADD: +2.50
OD_CYLINDER: -0.25
OS_VA2: 20/20
OS_CYLINDER: -0.25

## 2021-05-03 ASSESSMENT — TONOMETRY
OS_IOP_MMHG: 15
OD_IOP_MMHG: 15

## 2021-05-03 ASSESSMENT — CONFRONTATIONAL VISUAL FIELD TEST (CVF)
OD_FINDINGS: FULL
OS_FINDINGS: FULL

## 2021-05-03 ASSESSMENT — SPHEQUIV_DERIVED
OD_SPHEQUIV: 0.625
OS_SPHEQUIV: 0.625
OS_SPHEQUIV: 0.625
OD_SPHEQUIV: 0.625

## 2021-05-03 ASSESSMENT — PACHYMETRY
OD_CT_CORRECTION: -3
OS_CT_CORRECTION: -3
OD_CT_UM: 581
OS_CT_UM: 586

## 2021-05-03 ASSESSMENT — VISUAL ACUITY
OD_BCVA: 20/20-1
OS_BCVA: 20/400

## 2022-01-01 ENCOUNTER — HOSPITAL ENCOUNTER (EMERGENCY)
Facility: HOSPITAL | Age: 62
Discharge: HOME | End: 2022-01-01
Attending: EMERGENCY MEDICINE
Payer: COMMERCIAL

## 2022-01-01 VITALS
RESPIRATION RATE: 16 BRPM | OXYGEN SATURATION: 98 % | SYSTOLIC BLOOD PRESSURE: 148 MMHG | DIASTOLIC BLOOD PRESSURE: 91 MMHG | TEMPERATURE: 98.6 F | HEART RATE: 94 BPM

## 2022-01-01 DIAGNOSIS — F41.9 ANXIETY HYPERVENTILATION: Primary | ICD-10-CM

## 2022-01-01 DIAGNOSIS — G47.00 INSOMNIA: ICD-10-CM

## 2022-01-01 DIAGNOSIS — F45.8 ANXIETY HYPERVENTILATION: Primary | ICD-10-CM

## 2022-01-01 PROCEDURE — 99284 EMERGENCY DEPT VISIT MOD MDM: CPT | Performed by: EMERGENCY MEDICINE

## 2022-01-01 PROCEDURE — 99283 EMERGENCY DEPT VISIT LOW MDM: CPT

## 2022-01-01 RX ORDER — ALPRAZOLAM 0.5 MG/1
0.5 TABLET ORAL 3 TIMES DAILY PRN
Qty: 15 TABLET | Refills: 0 | Status: SHIPPED | OUTPATIENT
Start: 2022-01-01

## 2022-01-01 NOTE — ED PROVIDER NOTES
History  Chief Complaint   Patient presents with    Insomnia     Patient concerned about covid and his vital signs  Patient has been having a hard time sleeping  Patient stated his mind is constantly wondering and hes worried about falling asleep and if his vital signs are okay  Patient is a 71-year-old male with history of anxiety who presents emergency department due to intermittent hyperventilation and sense of self breathing and difficulty sleeping has been ongoing for several days worse tonight  Patient did not want to come to the ED and contacted his family doctor to make an appointment however was not able to get a prompt appointment and advised to go to urgent care if he wanted to be seen earlier urgent care said they are only doing COVID tests  No chest pain no fevers  No SI or HI  History provided by:  Patient      None       Past Medical History:   Diagnosis Date    Anxiety     COVID-19     Stroke [facility])     eye stroke        Past Surgical History:   Procedure Laterality Date    HERNIA REPAIR         History reviewed  No pertinent family history  I have reviewed and agree with the history as documented  E-Cigarette/Vaping    E-Cigarette Use Never User      E-Cigarette/Vaping Substances     Social History     Tobacco Use    Smoking status: Never Smoker    Smokeless tobacco: Current User     Types: Chew   Vaping Use    Vaping Use: Never used   Substance Use Topics    Alcohol use: Not Currently    Drug use: Not Currently       Review of Systems   Constitutional: Negative for activity change, appetite change, chills, fatigue and fever  HENT: Negative for congestion, ear pain, rhinorrhea and sore throat  Eyes: Negative for discharge, redness and visual disturbance  Respiratory: Negative for cough, chest tightness, shortness of breath and wheezing  Cardiovascular: Negative for chest pain and palpitations     Gastrointestinal: Negative for abdominal pain, constipation, diarrhea, nausea and vomiting  Endocrine: Negative for polydipsia and polyuria  Genitourinary: Negative for difficulty urinating, dysuria, frequency, hematuria and urgency  Musculoskeletal: Negative for arthralgias and myalgias  Skin: Negative for color change, pallor and rash  Neurological: Negative for dizziness, weakness, light-headedness, numbness and headaches  Hematological: Negative for adenopathy  Does not bruise/bleed easily  Psychiatric/Behavioral: Positive for sleep disturbance  The patient is nervous/anxious  All other systems reviewed and are negative  Physical Exam  Physical Exam  Vitals and nursing note reviewed  Constitutional:       Appearance: He is well-developed  HENT:      Head: Normocephalic and atraumatic  Right Ear: External ear normal       Left Ear: External ear normal       Nose: Nose normal    Eyes:      Conjunctiva/sclera: Conjunctivae normal       Pupils: Pupils are equal, round, and reactive to light  Cardiovascular:      Rate and Rhythm: Normal rate and regular rhythm  Heart sounds: Normal heart sounds  Pulmonary:      Effort: Pulmonary effort is normal  No respiratory distress  Breath sounds: Normal breath sounds  No wheezing or rales  Chest:      Chest wall: No tenderness  Abdominal:      General: Bowel sounds are normal  There is no distension  Palpations: Abdomen is soft  Tenderness: There is no abdominal tenderness  There is no guarding  Musculoskeletal:         General: Normal range of motion  Cervical back: Normal range of motion and neck supple  Skin:     General: Skin is warm and dry  Neurological:      Mental Status: He is alert and oriented to person, place, and time  Cranial Nerves: No cranial nerve deficit  Sensory: No sensory deficit           Vital Signs  ED Triage Vitals [01/01/22 0322]   Temperature Pulse Respirations Blood Pressure SpO2   98 6 °F (37 °C) 94 16 148/91 98 %      Temp Source Heart Rate Source Patient Position - Orthostatic VS BP Location FiO2 (%)   Temporal Monitor Sitting Right arm --      Pain Score       --           Vitals:    01/01/22 0322   BP: 148/91   Pulse: 94   Patient Position - Orthostatic VS: Sitting         Visual Acuity      ED Medications  Medications - No data to display    Diagnostic Studies  Results Reviewed     None                 No orders to display              Procedures  Procedures         ED Course                                             MDM  Number of Diagnoses or Management Options  Anxiety hyperventilation: new and does not require workup  Insomnia: new and does not require workup  Diagnosis management comments: Patient is afebrile nontoxic well-appearing clinically and hemodynamically stable in the emergency department lungs clear to auscultation bilaterally with normal O2 saturation on room air  Symptoms consistent with anxiety and hyperventilation and insomnia discussed risk benefits of treatment with Xanax as needed for sleep and anxiety patient would prefer this option for now understands risks and benefits associated as explained by me advised prompt follow-up with primary physician for further evaluation and treatment return precautions and anticipatory guidance discussed           Amount and/or Complexity of Data Reviewed  Decide to obtain previous medical records or to obtain history from someone other than the patient: yes  Review and summarize past medical records: yes    Risk of Complications, Morbidity, and/or Mortality  Presenting problems: moderate  Management options: moderate    Patient Progress  Patient progress: stable      Disposition  Final diagnoses:   Anxiety hyperventilation   Insomnia     Time reflects when diagnosis was documented in both MDM as applicable and the Disposition within this note     Time User Action Codes Description Comment    1/1/2022  3:30 AM Dago Escamilla Add [F41 9,  F45 8] Anxiety hyperventilation 1/1/2022  3:30 AM Monsonluis carlos Pepe Add [G47 00] Insomnia       ED Disposition     ED Disposition Condition Date/Time Comment    Discharge Stable Sat Jan 1, 2022  3:30 AM Brandi Andres discharge to home/self care  Follow-up Information    None         Patient's Medications   Discharge Prescriptions    ALPRAZOLAM (XANAX) 0 5 MG TABLET    Take 1 tablet (0 5 mg total) by mouth 3 (three) times a day as needed for anxiety or sleep for up to 15 doses       Start Date: 1/1/2022  End Date: --       Order Dose: 0 5 mg       Quantity: 15 tablet    Refills: 0       No discharge procedures on file      PDMP Review       Value Time User    PDMP Reviewed  Yes 1/1/2022  3:30 AM Calli Headley DO          ED Provider  Electronically Signed by           Calli Headley DO  01/01/22 8610

## 2022-04-14 ENCOUNTER — APPOINTMENT (OUTPATIENT)
Dept: LAB | Facility: HOSPITAL | Age: 62
End: 2022-04-14
Payer: COMMERCIAL

## 2022-04-14 DIAGNOSIS — R63.4 WEIGHT LOSS, UNINTENTIONAL: Primary | ICD-10-CM

## 2022-04-14 LAB — IGA SERPL-MCNC: 198 MG/DL (ref 70–400)

## 2022-04-14 PROCEDURE — 86364 TISS TRNSGLTMNASE EA IG CLAS: CPT

## 2022-04-14 PROCEDURE — 36415 COLL VENOUS BLD VENIPUNCTURE: CPT

## 2022-04-14 PROCEDURE — 86803 HEPATITIS C AB TEST: CPT

## 2022-04-14 PROCEDURE — 82784 ASSAY IGA/IGD/IGG/IGM EACH: CPT

## 2022-04-14 PROCEDURE — 86480 TB TEST CELL IMMUN MEASURE: CPT

## 2022-04-15 LAB
HCV AB S/CO SERPL IA: 0.1 S/CO RATIO (ref 0–0.9)
SL AMB INTERPRETATION: NORMAL
TTG IGA SER-ACNC: <2 U/ML (ref 0–3)

## 2022-04-18 LAB
GAMMA INTERFERON BACKGROUND BLD IA-ACNC: 0.02 IU/ML
M TB IFN-G BLD-IMP: NEGATIVE
M TB IFN-G CD4+ BCKGRND COR BLD-ACNC: 0.02 IU/ML
M TB IFN-G CD4+ BCKGRND COR BLD-ACNC: 0.02 IU/ML
MITOGEN IGNF BCKGRD COR BLD-ACNC: >10 IU/ML

## 2024-04-15 ENCOUNTER — DOCTOR'S OFFICE (OUTPATIENT)
Dept: URBAN - NONMETROPOLITAN AREA CLINIC 1 | Facility: CLINIC | Age: 64
Setting detail: OPHTHALMOLOGY
End: 2024-04-15
Payer: COMMERCIAL

## 2024-04-15 VITALS — HEIGHT: 60 IN

## 2024-04-15 DIAGNOSIS — H40.003: ICD-10-CM

## 2024-04-15 DIAGNOSIS — H34.11: ICD-10-CM

## 2024-04-15 DIAGNOSIS — G45.9: ICD-10-CM

## 2024-04-15 DIAGNOSIS — H43.813: ICD-10-CM

## 2024-04-15 PROBLEM — H52.03 HYPEROPIA; BOTH EYES: Status: ACTIVE | Noted: 2024-04-15

## 2024-04-15 PROCEDURE — 92134 CPTRZ OPH DX IMG PST SGM RTA: CPT | Performed by: OPHTHALMOLOGY

## 2024-04-15 PROCEDURE — 99214 OFFICE O/P EST MOD 30 MIN: CPT | Performed by: OPHTHALMOLOGY

## 2024-04-15 PROCEDURE — 92202 OPSCPY EXTND ON/MAC DRAW: CPT | Performed by: OPHTHALMOLOGY

## 2024-05-08 ENCOUNTER — DOCTOR'S OFFICE (OUTPATIENT)
Dept: URBAN - NONMETROPOLITAN AREA CLINIC 1 | Facility: CLINIC | Age: 64
Setting detail: OPHTHALMOLOGY
End: 2024-05-08
Payer: COMMERCIAL

## 2024-05-08 DIAGNOSIS — H52.4: ICD-10-CM

## 2024-05-08 DIAGNOSIS — H52.03: ICD-10-CM

## 2024-05-08 PROCEDURE — 92012 INTRM OPH EXAM EST PATIENT: CPT | Performed by: OPTOMETRIST

## 2024-05-08 ASSESSMENT — CONFRONTATIONAL VISUAL FIELD TEST (CVF)
OS_FINDINGS: FULL
OD_FINDINGS: FULL

## 2024-05-09 ENCOUNTER — OPTICAL OFFICE (OUTPATIENT)
Dept: URBAN - NONMETROPOLITAN AREA CLINIC 4 | Facility: CLINIC | Age: 64
Setting detail: OPHTHALMOLOGY
End: 2024-05-09
Payer: COMMERCIAL

## 2024-05-09 DIAGNOSIS — H52.03: ICD-10-CM

## 2024-05-09 PROCEDURE — V2203 LENS SPHCYL BIFOCAL 4.00D/.1: HCPCS | Mod: LT | Performed by: OPTOMETRIST

## 2024-05-09 PROCEDURE — V2784 LENS POLYCARB OR EQUAL: HCPCS | Mod: LT | Performed by: OPTOMETRIST

## 2024-05-09 PROCEDURE — V2203 LENS SPHCYL BIFOCAL 4.00D/.1: HCPCS | Performed by: OPTOMETRIST

## 2024-05-09 PROCEDURE — V2784 LENS POLYCARB OR EQUAL: HCPCS | Performed by: OPTOMETRIST

## 2024-05-09 PROCEDURE — V2020 VISION SVCS FRAMES PURCHASES: HCPCS | Performed by: OPTOMETRIST

## 2024-05-31 PROBLEM — H52.03 HYPEROPIA; BOTH EYES: Status: ACTIVE | Noted: 2024-05-31

## 2024-10-14 ENCOUNTER — DOCTOR'S OFFICE (OUTPATIENT)
Dept: URBAN - NONMETROPOLITAN AREA CLINIC 1 | Facility: CLINIC | Age: 64
Setting detail: OPHTHALMOLOGY
End: 2024-10-14
Payer: COMMERCIAL

## 2024-10-14 DIAGNOSIS — H40.001: ICD-10-CM

## 2024-10-14 DIAGNOSIS — H43.813: ICD-10-CM

## 2024-10-14 DIAGNOSIS — H34.11: ICD-10-CM

## 2024-10-14 DIAGNOSIS — H40.002: ICD-10-CM

## 2024-10-14 PROBLEM — H52.03 HYPEROPIA; BOTH EYES: Status: ACTIVE | Noted: 2024-10-14

## 2024-10-14 PROCEDURE — 92012 INTRM OPH EXAM EST PATIENT: CPT | Performed by: OPHTHALMOLOGY

## 2024-10-14 PROCEDURE — 92134 CPTRZ OPH DX IMG PST SGM RTA: CPT | Performed by: OPHTHALMOLOGY

## 2024-10-14 ASSESSMENT — VISUAL ACUITY: OD_BCVA: 20/30

## 2024-10-14 ASSESSMENT — REFRACTION_AUTOREFRACTION
OS_CYLINDER: -0.50
OD_AXIS: 107
OS_AXIS: 001
OS_SPHERE: +1.00
OD_SPHERE: +1.25
OD_CYLINDER: -1.25

## 2024-10-14 ASSESSMENT — REFRACTION_MANIFEST
OS_AXIS: 180
OD_AXIS: 105
OD_VA2: 20/400
OS_SPHERE: +1.00
OD_ADD: +2.50
OD_VA1: 20/400
OS_VA1: 20/25
OS_VA2: 20/25
OD_SPHERE: +1.25
OS_ADD: +2.50
OD_CYLINDER: -1.25
OS_CYLINDER: -0.50

## 2024-10-14 ASSESSMENT — KERATOMETRY
OD_K1POWER_DIOPTERS: 42.50
OS_AXISANGLE_DEGREES: 099
OD_AXISANGLE_DEGREES: 117
OS_K1POWER_DIOPTERS: 46.75
OS_K2POWER_DIOPTERS: 47.50
OD_K2POWER_DIOPTERS: 44.50

## 2024-11-13 ENCOUNTER — OFFICE VISIT (OUTPATIENT)
Dept: URGENT CARE | Facility: CLINIC | Age: 64
End: 2024-11-13
Payer: COMMERCIAL

## 2024-11-13 VITALS
BODY MASS INDEX: 24.25 KG/M2 | HEIGHT: 68 IN | HEART RATE: 105 BPM | DIASTOLIC BLOOD PRESSURE: 92 MMHG | RESPIRATION RATE: 16 BRPM | TEMPERATURE: 97.9 F | WEIGHT: 160 LBS | SYSTOLIC BLOOD PRESSURE: 122 MMHG | OXYGEN SATURATION: 99 %

## 2024-11-13 DIAGNOSIS — H93.13 TINNITUS OF BOTH EARS: ICD-10-CM

## 2024-11-13 DIAGNOSIS — H61.23 BILATERAL IMPACTED CERUMEN: Primary | ICD-10-CM

## 2024-11-13 PROCEDURE — 69209 REMOVE IMPACTED EAR WAX UNI: CPT

## 2024-11-13 PROCEDURE — 99213 OFFICE O/P EST LOW 20 MIN: CPT

## 2024-11-13 RX ORDER — DEXTROAMPHETAMINE SACCHARATE, AMPHETAMINE ASPARTATE MONOHYDRATE, DEXTROAMPHETAMINE SULFATE AND AMPHETAMINE SULFATE 1.25; 1.25; 1.25; 1.25 MG/1; MG/1; MG/1; MG/1
5 CAPSULE, EXTENDED RELEASE ORAL
COMMUNITY

## 2024-11-13 RX ORDER — HYDROXYZINE HYDROCHLORIDE 25 MG/1
1 TABLET, FILM COATED ORAL EVERY 6 HOURS PRN
COMMUNITY
Start: 2024-11-07

## 2024-11-13 RX ORDER — SERTRALINE HYDROCHLORIDE 25 MG/1
25 TABLET, FILM COATED ORAL DAILY
COMMUNITY
Start: 2024-11-07

## 2024-11-13 NOTE — PATIENT INSTRUCTIONS
"Cerumen removed  Do not insert objects into the ears   Follow up with PCP in 3-5 days if ringing in ears persists.  Proceed to  ER if symptoms worsen.    If tests have been performed at [facility], our office will contact you with results if changes need to be made to the care plan discussed with you at the visit.  You can review your full results on [facility] MyChart.    Patient Education     Ear wax impaction   The Basics   Written by the doctors and editors at [facility]   What is ear wax impaction? -- Ear wax impaction is when ear wax builds up enough to cause symptoms. Normally, ear wax helps to protect the insides of the ears and prevents injury or infection (figure 1). But having too much ear wax can cause symptoms like trouble hearing and sometimes pain. The medical term for ear wax is \"cerumen.\"  Young children and older adults are more likely than others to have ear wax impaction.  What causes ear wax impaction? -- Several different things can cause ear wax impaction:   Diseases that affect the ear - Some health problems can affect the shape of the inside of the ear, and make it hard for wax to move out. For example, skin problems that cause skin cells to shed a lot can lead to wax buildup in the ears.   Narrow ear canal (figure 2) - In some people, the ear canals are narrower than in others. These people might be more likely to have ear wax impaction. A person's ear canal can become narrower after an ear injury or after severe or multiple ear infections.   Changes in ear wax and lining due to aging - As people get older, their ear wax gets harder and thicker. This makes it more difficult for the wax to move out of the ear as it normally should.   Ear-cleaning habits - Some people try to clean their ears using cotton swabs (Q-Tips) or other tools. This can actually push the wax deeper into the ear instead of getting it out. Over time, this can cause ear wax impaction.   Making too much ear wax - Some people " "make more ear wax than others. This can happen when water gets trapped in the ear, or when the ear is injured. But some people just have a lot of ear wax for no obvious reason.   Using devices that go into the ear - Hearing aids, \"ear bud\"-style headphones, and ear plugs can cause ear wax impaction if they are used over a long period of time.  What are the symptoms of ear wax impaction? -- The symptoms include:   Trouble hearing   Pain in the ear   Feeling like the ear is blocked or plugged   Hearing a ringing noise in the ear  These symptoms can happen in 1 or both ears.  Should I see a doctor or nurse? -- Yes. If you or your child have any of these symptoms, see a doctor or nurse. They can check the insides of the ears to figure out if the symptoms are caused by ear wax impaction or another problem, such as an ear infection.  Should I clean my (or my child's) ears at home? -- No. The insides of the ears do not usually need to be cleaned. Sticking anything into the ears can push the wax in deeper and cause impaction.  \"Ear candling\" involves lighting 1 end of a hollow candle, and putting the other end in the ear. Ear candling is not recommended for ear wax removal. It does not remove ear wax and can even cause ear injuries and burns.  How is ear wax impaction treated? -- There are several treatments to remove impacted ear wax. Doctors and nurses offer these treatments only to people who have bothersome symptoms. They do not recommend treatments for removing ear wax in people who have no symptoms, even if they have ear wax impaction.  In some cases, doctors and nurses will remove ear wax in people whose ears are impacted and who aren't able to let others know if they have symptoms or not. This can include young children, and people who are confused or have trouble communicating, including some older adults.  There are several different ways to remove ear wax:   Ear drops - Special ear drops can soften ear wax and " help it to drain out. Ear drops are not usually safe for people with an ear infection or damage to the eardrum.   Rinsing - In some cases, a doctor or nurse can remove impacted ear wax by squirting water (or another liquid) into the ear to rinse it out.   Special tools - A doctor or nurse might use a special tool to remove ear wax. There are different types of tools that can do this safely. These include small sticks, hooks, and spoons. There are also tools that use suction to pull the wax out.  Can ear wax impaction be prevented? -- It depends. If you have repeated problems with ear wax impaction, talk to your doctor or nurse. They might be able to suggest ways to help prevent future impactions. For example, they might suggest using mineral oil to soften the ear wax, removing hearing aids overnight if you use them, or getting your ears cleaned regularly by a doctor or nurse.  What problems should I watch for? -- Call for advice if:   You have signs of infection - These include fever of 100.4°F (38°C) or higher or chills.   Your ear is bleeding or draining pus.   You have pain, ringing in the ear, or hearing loss that is new or worse than before.   Your symptoms are not getting better after a few days, if you are using ear drop treatments.  All topics are updated as new evidence becomes available and our peer review process is complete.  This topic retrieved from "[employer] on: Feb 26, 2024.  Topic 82669 Version 16.0  Release: 32.2.4 - C32.56  © 2024 [employer] and/or its affiliates. All rights reserved.  figure 1: Ear wax impaction     This drawing shows where ear wax can build up (become impacted) in the ear canal.  Graphic 18170 Version 2.0  figure 2: Normal ear     This figure shows the normal parts of the outer, middle, and inner ear.  Graphic 77051 Version 5.0  Consumer Information Use and Disclaimer   Disclaimer: This generalized information is a limited summary of diagnosis, treatment, and/or medication  information. It is not meant to be comprehensive and should be used as a tool to help the user understand and/or assess potential diagnostic and treatment options. It does NOT include all information about conditions, treatments, medications, side effects, or risks that may apply to a specific patient. It is not intended to be medical advice or a substitute for the medical advice, diagnosis, or treatment of a health care provider based on the health care provider's examination and assessment of a patient's specific and unique circumstances. Patients must speak with a health care provider for complete information about their health, medical questions, and treatment options, including any risks or benefits regarding use of medications. This information does not endorse any treatments or medications as safe, effective, or approved for treating a specific patient. [employer] and its affiliates disclaim any warranty or liability relating to this information or the use thereof.The use of this information is governed by the Terms of Use, available at https://www.[employer].com/en/know/clinical-effectiveness-terms. 2024© [employer] and its affiliates and/or licensors. All rights reserved.  Copyright   © 2024 [employer] and/or its affiliates. All rights reserved.

## 2024-11-13 NOTE — PROGRESS NOTES
"  [facility]        NAME: Magnus Marie is a 64 y.o. male  : 1960    MRN: [id number]  DATE: 2024  TIME: 10:34 AM    Assessment and Plan   Bilateral impacted cerumen [H61.23]  1. Bilateral impacted cerumen  Ear cerumen removal      2. Tinnitus of both ears          Ear cerumen removal    Date/Time: 2024 10:33 AM    Performed by: STEPHANIE Zazueta  Authorized by: DO Cabrera Segovia Protocol:  procedure performed by consultantConsent: Verbal consent obtained.  Risks and benefits: risks, benefits and alternatives were discussed  Consent given by: patient  Time out: Immediately prior to procedure a \"time out\" was called to verify the correct patient, procedure, equipment, support staff and site/side marked as required.  Patient understanding: patient states understanding of the procedure being performed  Patient identity confirmed: verbally with patient    Patient location:  Clinic  Procedure details:     Location:  Both ears    Procedure type: irrigation only    Post-procedure details:     Complication:  None    Hearing quality:  Improved    Patient tolerance of procedure:  Tolerated well, no immediate complications    Cerumen removed from bilateral ear canals with irrigation/lavage. Tolerated well. Tms intact and non-erythematous. Tinnitus likely secondary to cerumen impaction but if persists, discuss with PCP new medication and side effects. Encouraged continued supportive measures.  Follow up with PCP in 3-5 days or proceed to emergency department for worsening symptoms.  Patient verbalized understanding of instructions given.       Patient Instructions     Patient Instructions   Cerumen removed  Do not insert objects into the ears   Follow up with PCP in 3-5 days if ringing in ears persists.  Proceed to  ER if symptoms worsen.    If tests have been performed at [facility], our office will contact you with results if changes need to be made to the care plan discussed " "with you at the visit.  You can review your full results on [facility] MyChart.    Patient Education     Ear wax impaction   The Basics   Written by the doctors and editors at [facility]   What is ear wax impaction? -- Ear wax impaction is when ear wax builds up enough to cause symptoms. Normally, ear wax helps to protect the insides of the ears and prevents injury or infection (figure 1). But having too much ear wax can cause symptoms like trouble hearing and sometimes pain. The medical term for ear wax is \"cerumen.\"  Young children and older adults are more likely than others to have ear wax impaction.  What causes ear wax impaction? -- Several different things can cause ear wax impaction:   Diseases that affect the ear - Some health problems can affect the shape of the inside of the ear, and make it hard for wax to move out. For example, skin problems that cause skin cells to shed a lot can lead to wax buildup in the ears.   Narrow ear canal (figure 2) - In some people, the ear canals are narrower than in others. These people might be more likely to have ear wax impaction. A person's ear canal can become narrower after an ear injury or after severe or multiple ear infections.   Changes in ear wax and lining due to aging - As people get older, their ear wax gets harder and thicker. This makes it more difficult for the wax to move out of the ear as it normally should.   Ear-cleaning habits - Some people try to clean their ears using cotton swabs (Q-Tips) or other tools. This can actually push the wax deeper into the ear instead of getting it out. Over time, this can cause ear wax impaction.   Making too much ear wax - Some people make more ear wax than others. This can happen when water gets trapped in the ear, or when the ear is injured. But some people just have a lot of ear wax for no obvious reason.   Using devices that go into the ear - Hearing aids, \"ear bud\"-style headphones, and ear plugs can cause ear wax " "impaction if they are used over a long period of time.  What are the symptoms of ear wax impaction? -- The symptoms include:   Trouble hearing   Pain in the ear   Feeling like the ear is blocked or plugged   Hearing a ringing noise in the ear  These symptoms can happen in 1 or both ears.  Should I see a doctor or nurse? -- Yes. If you or your child have any of these symptoms, see a doctor or nurse. They can check the insides of the ears to figure out if the symptoms are caused by ear wax impaction or another problem, such as an ear infection.  Should I clean my (or my child's) ears at home? -- No. The insides of the ears do not usually need to be cleaned. Sticking anything into the ears can push the wax in deeper and cause impaction.  \"Ear candling\" involves lighting 1 end of a hollow candle, and putting the other end in the ear. Ear candling is not recommended for ear wax removal. It does not remove ear wax and can even cause ear injuries and burns.  How is ear wax impaction treated? -- There are several treatments to remove impacted ear wax. Doctors and nurses offer these treatments only to people who have bothersome symptoms. They do not recommend treatments for removing ear wax in people who have no symptoms, even if they have ear wax impaction.  In some cases, doctors and nurses will remove ear wax in people whose ears are impacted and who aren't able to let others know if they have symptoms or not. This can include young children, and people who are confused or have trouble communicating, including some older adults.  There are several different ways to remove ear wax:   Ear drops - Special ear drops can soften ear wax and help it to drain out. Ear drops are not usually safe for people with an ear infection or damage to the eardrum.   Rinsing - In some cases, a doctor or nurse can remove impacted ear wax by squirting water (or another liquid) into the ear to rinse it out.   Special tools - A doctor or nurse " might use a special tool to remove ear wax. There are different types of tools that can do this safely. These include small sticks, hooks, and spoons. There are also tools that use suction to pull the wax out.  Can ear wax impaction be prevented? -- It depends. If you have repeated problems with ear wax impaction, talk to your doctor or nurse. They might be able to suggest ways to help prevent future impactions. For example, they might suggest using mineral oil to soften the ear wax, removing hearing aids overnight if you use them, or getting your ears cleaned regularly by a doctor or nurse.  What problems should I watch for? -- Call for advice if:   You have signs of infection - These include fever of 100.4°F (38°C) or higher or chills.   Your ear is bleeding or draining pus.   You have pain, ringing in the ear, or hearing loss that is new or worse than before.   Your symptoms are not getting better after a few days, if you are using ear drop treatments.  All topics are updated as new evidence becomes available and our peer review process is complete.  This topic retrieved from [employer] on: Feb 26, 2024.  Topic 19901 Version 16.0  Release: 32.2.4 - C32.56  © 2024 UpToDate, Inc. and/or its affiliates. All rights reserved.  figure 1: Ear wax impaction     This drawing shows where ear wax can build up (become impacted) in the ear canal.  Graphic 30517 Version 2.0  figure 2: Normal ear     This figure shows the normal parts of the outer, middle, and inner ear.  Graphic 49161 Version 5.0  Consumer Information Use and Disclaimer   Disclaimer: This generalized information is a limited summary of diagnosis, treatment, and/or medication information. It is not meant to be comprehensive and should be used as a tool to help the user understand and/or assess potential diagnostic and treatment options. It does NOT include all information about conditions, treatments, medications, side effects, or risks that may apply to a  specific patient. It is not intended to be medical advice or a substitute for the medical advice, diagnosis, or treatment of a health care provider based on the health care provider's examination and assessment of a patient's specific and unique circumstances. Patients must speak with a health care provider for complete information about their health, medical questions, and treatment options, including any risks or benefits regarding use of medications. This information does not endorse any treatments or medications as safe, effective, or approved for treating a specific patient. UpToDate, Inc. and its affiliates disclaim any warranty or liability relating to this information or the use thereof.The use of this information is governed by the Terms of Use, available at https://www.[employer].com/en/know/clinical-effectiveness-terms. 2024© UpToDate, Inc. and its affiliates and/or licensors. All rights reserved.  Copyright   © 2024 UpToDate, Inc. and/or its affiliates. All rights reserved.        Chief Complaint     Chief Complaint   Patient presents with    Tinnitus     Started 4 days ago  Bilateral ear ringing  OTC ear drops, debrox X 2 days  Started on zoloft, not sure if the ringing is from the new medication  Wears earplugs at work         History of Present Illness       64-year-old male with a past medical history significant for anxiety presents with complaints of bilateral ear ringing x 5 days.  Patient denies fever or URI symptoms.  No headache, numbness, tingling, or weakness.  Reports does wear earplugs for work and has been using OTC Debrox drops as concern for possible cerumen impaction x 2 days.  Also reports recently starting Zoloft about 5 days ago as well and unsure if tinnitus is secondary to medication.        Review of Systems   Review of Systems   Constitutional:  Negative for chills and fever.   HENT:  Positive for tinnitus. Negative for congestion, ear discharge, ear pain and rhinorrhea.   "  Eyes:  Negative for discharge.   Respiratory:  Negative for cough and shortness of breath.    Cardiovascular:  Negative for chest pain.   Gastrointestinal:  Negative for abdominal pain, diarrhea, nausea and vomiting.   Skin:  Negative for rash.         Current Medications       Current Outpatient Medications:     hydrOXYzine HCL (ATARAX) 25 mg tablet, Take 1 tablet by mouth every 6 (six) hours as needed, Disp: , Rfl:     sertraline (ZOLOFT) 25 mg tablet, Take 25 mg by mouth daily, Disp: , Rfl:     ALPRAZolam (XANAX) 0.5 mg tablet, Take 1 tablet (0.5 mg total) by mouth 3 (three) times a day as needed for anxiety or sleep for up to 15 doses (Patient not taking: Reported on 11/13/2024), Disp: 15 tablet, Rfl: 0    amphetamine-dextroamphetamine (ADDERALL XR) 5 MG 24 hr capsule, Take 5 mg by mouth, Disp: , Rfl:     Current Allergies     Allergies as of 11/13/2024    (No Known Allergies)            The following portions of the patient's history were reviewed and updated as appropriate: allergies, current medications, past family history, past medical history, past social history, past surgical history and problem list.     Past Medical History:   Diagnosis Date    Anxiety     COVID-19     Stroke (HCC)     eye stroke        Past Surgical History:   Procedure Laterality Date    HERNIA REPAIR         History reviewed. No pertinent family history.      Medications have been verified.        Objective   /92   Pulse 105   Temp 97.9 °F (36.6 °C)   Resp 16   Ht 5' 8\" (1.727 m)   Wt 72.6 kg (160 lb)   SpO2 99%   BMI 24.33 kg/m²   No LMP for male patient.       Physical Exam     Physical Exam  Vitals and nursing note reviewed.   Constitutional:       General: He is not in acute distress.     Appearance: He is not toxic-appearing.   HENT:      Head: Normocephalic.      Right Ear: There is impacted cerumen.      Left Ear: There is impacted cerumen.      Nose: Nose normal.   Eyes:      Conjunctiva/sclera: Conjunctivae " normal.   Pulmonary:      Effort: Pulmonary effort is normal.   Skin:     General: Skin is warm and dry.   Neurological:      Mental Status: He is alert and oriented to person, place, and time.      Gait: Gait is intact.   Psychiatric:         Mood and Affect: Mood normal.         Behavior: Behavior normal.

## 2025-01-03 ENCOUNTER — OFFICE VISIT (OUTPATIENT)
Dept: URGENT CARE | Facility: CLINIC | Age: 65
End: 2025-01-03
Payer: COMMERCIAL

## 2025-01-03 VITALS
OXYGEN SATURATION: 98 % | BODY MASS INDEX: 24.25 KG/M2 | TEMPERATURE: 97.1 F | RESPIRATION RATE: 16 BRPM | DIASTOLIC BLOOD PRESSURE: 76 MMHG | HEART RATE: 89 BPM | WEIGHT: 160 LBS | SYSTOLIC BLOOD PRESSURE: 134 MMHG | HEIGHT: 68 IN

## 2025-01-03 DIAGNOSIS — H93.13 TINNITUS OF BOTH EARS: Primary | ICD-10-CM

## 2025-01-03 PROCEDURE — 99213 OFFICE O/P EST LOW 20 MIN: CPT

## 2025-01-03 RX ORDER — ACETAMINOPHEN 160 MG
2000 TABLET,DISINTEGRATING ORAL DAILY
COMMUNITY
Start: 2024-12-05

## 2025-01-03 RX ORDER — BUSPIRONE HYDROCHLORIDE 10 MG/1
TABLET ORAL
COMMUNITY
Start: 2025-01-02

## 2025-01-03 RX ORDER — MIRTAZAPINE 15 MG/1
15 TABLET, FILM COATED ORAL
COMMUNITY
Start: 2024-12-09

## 2025-01-03 NOTE — PROGRESS NOTES
[facility]        NAME: Magnus Marie is a 64 y.o. male  : 1960    MRN: [id number]  DATE: January 3, 2025  TIME: 10:33 AM    Assessment and Plan   Tinnitus of both ears [H93.13]  1. Tinnitus of both ears              Patient Instructions       Follow up with PCP in 3-5 days.  Proceed to  ER if symptoms worsen.    If tests are performed, our office will contact you with results only if changes need to made to the care plan discussed with you at the visit. [facility]    Chief Complaint     Chief Complaint   Patient presents with   • Ear Fullness     Started weeks ago  Bilateral ear fullness, and ringing           History of Present Illness       Started weeks ago   Bilateral ear fullness, and ringing   The patient's tinnitus is described as 'debilitating,' likened to constant 'train whistles' in both ears. He has been trialing hearing aids, which have improved his hearing but have also heightened the tinnitus. He has seen two audiologists for this issue, who have diagnosed significant hearing loss and advised that the tinnitus is likely to persist.  Has a follow-up appointment with ENT in March.    Ear Fullness   Associated symptoms include hearing loss. Pertinent negatives include no coughing, headaches or rhinorrhea.       Review of Systems   Review of Systems   Constitutional:  Negative for appetite change, chills, fatigue and fever.   HENT:  Positive for hearing loss and tinnitus. Negative for congestion, ear pain, postnasal drip, rhinorrhea, sinus pressure and sinus pain.    Respiratory:  Negative for cough, shortness of breath, wheezing and stridor.    Cardiovascular:  Negative for chest pain and palpitations.   Neurological:  Negative for dizziness, syncope, light-headedness and headaches.         Current Medications       Current Outpatient Medications:   •  amphetamine-dextroamphetamine (ADDERALL XR) 5 MG 24 hr capsule, Take 5 mg by mouth,  "Disp: , Rfl:   •  busPIRone (BUSPAR) 10 mg tablet, , Disp: , Rfl:   •  Cholecalciferol (Vitamin D3) 50 MCG (2000 UT) capsule, Take 2,000 Units by mouth daily, Disp: , Rfl:   •  mirtazapine (REMERON) 15 mg tablet, Take 15 mg by mouth daily at bedtime, Disp: , Rfl:   •  ALPRAZolam (XANAX) 0.5 mg tablet, Take 1 tablet (0.5 mg total) by mouth 3 (three) times a day as needed for anxiety or sleep for up to 15 doses (Patient not taking: Reported on 1/3/2025), Disp: 15 tablet, Rfl: 0  •  hydrOXYzine HCL (ATARAX) 25 mg tablet, Take 1 tablet by mouth every 6 (six) hours as needed (Patient not taking: Reported on 1/3/2025), Disp: , Rfl:   •  sertraline (ZOLOFT) 25 mg tablet, Take 25 mg by mouth daily (Patient not taking: Reported on 1/3/2025), Disp: , Rfl:     Current Allergies     Allergies as of 01/03/2025   • (No Known Allergies)            The following portions of the patient's history were reviewed and updated as appropriate: allergies, current medications, past family history, past medical history, past social history, past surgical history and problem list.     Past Medical History:   Diagnosis Date   • Anxiety    • COVID-19    • Stroke (HCC)     eye stroke        Past Surgical History:   Procedure Laterality Date   • HERNIA REPAIR         No family history on file.      Medications have been verified.        Objective   /76   Pulse 89   Temp (!) 97.1 °F (36.2 °C)   Resp 16   Ht 5' 8\" (1.727 m)   Wt 72.6 kg (160 lb)   SpO2 98%   BMI 24.33 kg/m²        Physical Exam     Physical Exam  Vitals and nursing note reviewed.   Constitutional:       General: He is not in acute distress.     Appearance: Normal appearance. He is normal weight. He is not ill-appearing, toxic-appearing or diaphoretic.   HENT:      Head: Normocephalic and atraumatic.      Right Ear: Tympanic membrane, ear canal and external ear normal. There is no impacted cerumen.      Left Ear: Tympanic membrane, ear canal and external ear normal. " There is no impacted cerumen.   Cardiovascular:      Rate and Rhythm: Normal rate and regular rhythm.      Pulses: Normal pulses.      Heart sounds: Normal heart sounds. No murmur heard.     No friction rub. No gallop.   Pulmonary:      Effort: Pulmonary effort is normal. No respiratory distress.      Breath sounds: Normal breath sounds. No stridor. No wheezing, rhonchi or rales.   Chest:      Chest wall: No tenderness.   Neurological:      Mental Status: He is alert.

## 2025-02-10 ENCOUNTER — DOCTOR'S OFFICE (OUTPATIENT)
Dept: URBAN - NONMETROPOLITAN AREA CLINIC 1 | Facility: CLINIC | Age: 65
Setting detail: OPHTHALMOLOGY
End: 2025-02-10
Payer: COMMERCIAL

## 2025-02-10 DIAGNOSIS — H43.813: ICD-10-CM

## 2025-02-10 DIAGNOSIS — G45.9: ICD-10-CM

## 2025-02-10 DIAGNOSIS — Z02.71: ICD-10-CM

## 2025-02-10 DIAGNOSIS — H40.003: ICD-10-CM

## 2025-02-10 DIAGNOSIS — H34.11: ICD-10-CM

## 2025-02-10 PROBLEM — H52.03 HYPEROPIA; BOTH EYES: Status: ACTIVE | Noted: 2025-02-10

## 2025-02-10 PROCEDURE — 92134 CPTRZ OPH DX IMG PST SGM RTA: CPT | Performed by: OPHTHALMOLOGY

## 2025-02-10 PROCEDURE — 99214 OFFICE O/P EST MOD 30 MIN: CPT | Performed by: OPHTHALMOLOGY

## 2025-02-10 PROCEDURE — MEDICAL RE MEDICAL RECORDS: Performed by: OPHTHALMOLOGY

## 2025-02-10 ASSESSMENT — REFRACTION_MANIFEST
OS_CYLINDER: -0.50
OS_AXIS: 180
OD_ADD: +2.50
OS_SPHERE: +1.00
OS_ADD: +2.50
OD_AXIS: 105
OS_VA1: 20/25
OD_SPHERE: +1.25
OD_VA1: 20/400
OD_CYLINDER: -1.25
OS_VA2: 20/25
OD_VA2: 20/400

## 2025-02-10 ASSESSMENT — VISUAL ACUITY
OD_BCVA: 20/30-1
OS_BCVA: HM

## 2025-02-10 ASSESSMENT — REFRACTION_AUTOREFRACTION
OS_AXIS: 001
OS_SPHERE: +1.00
OS_CYLINDER: -0.50
OD_SPHERE: +1.25
OD_CYLINDER: -1.25
OD_AXIS: 107

## 2025-02-10 ASSESSMENT — CONFRONTATIONAL VISUAL FIELD TEST (CVF)
OD_FINDINGS: FULL
OS_FINDINGS: FULL

## 2025-02-10 ASSESSMENT — KERATOMETRY
OS_K2POWER_DIOPTERS: 47.50
OS_K1POWER_DIOPTERS: 46.75
OD_AXISANGLE_DEGREES: 117
OD_K1POWER_DIOPTERS: 42.50
OS_AXISANGLE_DEGREES: 099
OD_K2POWER_DIOPTERS: 44.50

## 2025-06-09 ENCOUNTER — DOCTOR'S OFFICE (OUTPATIENT)
Dept: URBAN - NONMETROPOLITAN AREA CLINIC 1 | Facility: CLINIC | Age: 65
Setting detail: OPHTHALMOLOGY
End: 2025-06-09
Payer: COMMERCIAL

## 2025-06-09 VITALS — HEIGHT: 60 IN

## 2025-06-09 DIAGNOSIS — H34.11: ICD-10-CM

## 2025-06-09 DIAGNOSIS — G45.9: ICD-10-CM

## 2025-06-09 DIAGNOSIS — H40.003: ICD-10-CM

## 2025-06-09 DIAGNOSIS — H43.813: ICD-10-CM

## 2025-06-09 PROBLEM — H52.03 HYPEROPIA; BOTH EYES: Status: ACTIVE | Noted: 2025-06-09

## 2025-06-09 PROBLEM — H25.13 CATARACT NUCLEAR SCLEROSIS; BOTH EYES: Status: ACTIVE | Noted: 2025-06-09

## 2025-06-09 PROCEDURE — 92202 OPSCPY EXTND ON/MAC DRAW: CPT | Performed by: OPHTHALMOLOGY

## 2025-06-09 PROCEDURE — 99214 OFFICE O/P EST MOD 30 MIN: CPT | Performed by: OPHTHALMOLOGY

## 2025-06-09 PROCEDURE — 92134 CPTRZ OPH DX IMG PST SGM RTA: CPT | Performed by: OPHTHALMOLOGY

## 2025-06-09 ASSESSMENT — REFRACTION_AUTOREFRACTION
OD_AXIS: 107
OS_SPHERE: +1.00
OD_CYLINDER: -1.25
OD_SPHERE: +1.25
OS_CYLINDER: -0.50
OS_AXIS: 001

## 2025-06-09 ASSESSMENT — REFRACTION_MANIFEST
OD_VA2: 20/400
OD_ADD: +2.50
OS_VA2: 20/25
OD_VA1: 20/400
OD_CYLINDER: -1.25
OS_ADD: +2.50
OS_AXIS: 180
OD_SPHERE: +1.25
OS_VA1: 20/25
OS_SPHERE: +1.00
OS_CYLINDER: -0.50
OD_AXIS: 105

## 2025-06-09 ASSESSMENT — KERATOMETRY
OS_K1POWER_DIOPTERS: 46.75
OS_K2POWER_DIOPTERS: 47.50
OD_AXISANGLE_DEGREES: 117
OD_K1POWER_DIOPTERS: 42.50
OS_AXISANGLE_DEGREES: 099
OD_K2POWER_DIOPTERS: 44.50

## 2025-06-09 ASSESSMENT — VISUAL ACUITY
OD_BCVA: 20/30+2
OS_BCVA: HM

## 2025-06-09 ASSESSMENT — CONFRONTATIONAL VISUAL FIELD TEST (CVF)
OS_FINDINGS: FULL
OD_FINDINGS: FULL

## 2025-07-25 ENCOUNTER — DOCTOR'S OFFICE (OUTPATIENT)
Dept: URBAN - NONMETROPOLITAN AREA CLINIC 1 | Facility: CLINIC | Age: 65
Setting detail: OPHTHALMOLOGY
End: 2025-07-25
Payer: COMMERCIAL

## 2025-07-25 DIAGNOSIS — Z02.71: ICD-10-CM

## 2025-07-25 PROCEDURE — MEDICAL RE MEDICAL RECORDS: Performed by: OPHTHALMOLOGY

## 2025-08-22 ENCOUNTER — DOCTOR'S OFFICE (OUTPATIENT)
Dept: URBAN - NONMETROPOLITAN AREA CLINIC 1 | Facility: CLINIC | Age: 65
Setting detail: OPHTHALMOLOGY
End: 2025-08-22
Payer: COMMERCIAL

## 2025-08-22 VITALS — HEIGHT: 60 IN

## 2025-08-22 DIAGNOSIS — H25.13: ICD-10-CM

## 2025-08-22 DIAGNOSIS — H04.123: ICD-10-CM

## 2025-08-22 DIAGNOSIS — H43.813: ICD-10-CM

## 2025-08-22 DIAGNOSIS — H47.20: ICD-10-CM

## 2025-08-22 DIAGNOSIS — H53.10: ICD-10-CM

## 2025-08-22 DIAGNOSIS — H34.11: ICD-10-CM

## 2025-08-22 PROBLEM — H52.03 HYPEROPIA; BOTH EYES: Status: ACTIVE | Noted: 2025-08-22

## 2025-08-22 PROCEDURE — 92014 COMPRE OPH EXAM EST PT 1/>: CPT

## 2025-08-22 PROCEDURE — 92083 EXTENDED VISUAL FIELD XM: CPT

## 2025-08-22 ASSESSMENT — REFRACTION_CURRENTRX
OD_AXIS: 106
OS_VPRISM_DIRECTION: BF
OD_ADD: +2.50
OS_ADD: +2.50
OS_CYLINDER: -0.50
OD_OVR_VA: 20/
OD_SPHERE: +1.50
OD_VPRISM_DIRECTION: BF
OS_OVR_VA: 20/
OS_AXIS: 008
OS_SPHERE: +1.00
OD_CYLINDER: -1.25

## 2025-08-22 ASSESSMENT — KERATOMETRY
OS_K1POWER_DIOPTERS: 46.75
OD_K2POWER_DIOPTERS: 44.50
OS_K2POWER_DIOPTERS: 47.50
OD_K1POWER_DIOPTERS: 42.50
OD_AXISANGLE_DEGREES: 117
OS_AXISANGLE_DEGREES: 099

## 2025-08-22 ASSESSMENT — PACHYMETRY
OS_CT_UM: 586
OD_CT_CORRECTION: -3
OD_CT_UM: 581
OS_CT_CORRECTION: -3

## 2025-08-22 ASSESSMENT — REFRACTION_MANIFEST
OD_VA2: HM
OS_AXIS: 010
OS_VA2: 20/30
OS_ADD: +2.50
OS_SPHERE: +1.00
OD_ADD: +2.50
OS_CYLINDER: -0.50
OS_VA1: 20/30
OD_VA1: HM
OU_VA: 20/30

## 2025-08-22 ASSESSMENT — CONFRONTATIONAL VISUAL FIELD TEST (CVF)
OD_FINDINGS: FULL
OS_FINDINGS: FULL

## 2025-08-22 ASSESSMENT — REFRACTION_AUTOREFRACTION
OD_SPHERE: +2.00
OD_AXIS: 093
OD_CYLINDER: -1.25
OS_SPHERE: +1.25
OS_AXIS: 022
OS_CYLINDER: -0.50

## 2025-08-22 ASSESSMENT — TEAR BREAK UP TIME (TBUT)
OD_TBUT: 6 SEC
OS_TBUT: 6 SEC

## 2025-08-22 ASSESSMENT — VISUAL ACUITY
OS_BCVA: HM
OD_BCVA: 20/30-1

## 2025-08-22 ASSESSMENT — TONOMETRY
OD_IOP_MMHG: 20
OS_IOP_MMHG: 20